# Patient Record
Sex: MALE | URBAN - METROPOLITAN AREA
[De-identification: names, ages, dates, MRNs, and addresses within clinical notes are randomized per-mention and may not be internally consistent; named-entity substitution may affect disease eponyms.]

---

## 2019-12-11 ENCOUNTER — APPOINTMENT (RX ONLY)
Dept: URBAN - METROPOLITAN AREA CLINIC 168 | Facility: CLINIC | Age: 29
Setting detail: DERMATOLOGY
End: 2019-12-11

## 2019-12-11 DIAGNOSIS — D69.0 ALLERGIC PURPURA: ICD-10-CM

## 2019-12-11 PROBLEM — L30.9 DERMATITIS, UNSPECIFIED: Status: ACTIVE | Noted: 2019-12-11

## 2019-12-11 PROCEDURE — ? COUNSELING

## 2019-12-11 PROCEDURE — ? BIOPSY BY PUNCH METHOD

## 2019-12-11 PROCEDURE — 11104 PUNCH BX SKIN SINGLE LESION: CPT

## 2019-12-11 ASSESSMENT — LOCATION ZONE DERM: LOCATION ZONE: ARM

## 2019-12-11 ASSESSMENT — LOCATION SIMPLE DESCRIPTION DERM: LOCATION SIMPLE: LEFT UPPER ARM

## 2019-12-11 ASSESSMENT — LOCATION DETAILED DESCRIPTION DERM: LOCATION DETAILED: LEFT PROXIMAL POSTERIOR UPPER ARM

## 2019-12-11 ASSESSMENT — SEVERITY ASSESSMENT: SEVERITY: MODERATE TO SEVERE

## 2019-12-11 NOTE — PROCEDURE: BIOPSY BY PUNCH METHOD
X Size Of Lesion In Cm (Optional): 0
Notification Instructions: Patient will be notified of biopsy results. However, patient instructed to call the office if not contacted within 2 weeks.
Bill [id number] For Specimen Handling/Conveyance To Laboratory?: no
Detail Level: Detailed
Punch Size In Mm: 4
Consent: Written consent was obtained and risks were reviewed including but not limited to scarring, infection, bleeding, scabbing, incomplete removal, nerve damage and allergy to anesthesia.
Suture Removal: 10 days
Anesthesia Volume In Cc (Will Not Render If 0): 1.5
Billing Type: Third-Party Bill
Epidermal Sutures: 4-0 Ethilon
Wound Care: Petrolatum
Render Post-Care Instructions In Note?: yes
Biopsy Type: DIF
Hemostasis: None
Dressing: bandage
Post-Care Instructions: I reviewed with the patient in detail post-care instructions. Patient is to keep the biopsy site dry overnight, and then starting tomorrow wash with an antiobiotic soap, rinse with water, and apply petrolatum and a bandage. Repeat this daily until sutures are removed at scheduled suture removal appointment. Written wound care handout given to patient and discharged home. Procedure tolerated well.
Lab: [id number]
Home Suture Removal Text: Patient was provided a home suture removal kit and will remove their sutures at home.  If they have any questions or difficulties they will call the office.
Anesthesia Type: 1% lidocaine with epinephrine and a 1:10 solution of 8.4% sodium bicarbonate

## 2019-12-11 NOTE — HPI: RASH
What Type Of Note Output Would You Prefer (Optional)?: Bullet Format
How Severe Is Your Rash?: moderate
Is This A New Presentation, Or A Follow-Up?: Rash
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 It went away with treatment (steroids did not work at that time, may have been treated with Cellcept by renal).  He flared mildly about a  year and a half ago with a flu, but was not treated and this episode resolved on its own.   This current flare started early September/late October and is not resolving as easily.  He has associated joint and arthritis pain.  In the past he has not had this with past flares. He notes He is seeing nephrology, saw previous dermatologist, bx of skin showed LCV but DIF was negative. To his knowledge, all work up thus far has been negative for any other underlying causes/triggers including negative serologies for Lupus and Hepatitis. He does not know if he was tested for RA. \\n\\nHe sees Dr. Fred Morgan and has a follow up scheduled for January 20, 2020.  Cellcept dose currently 1.5gm BID (on a total of 1 month, dose increased from 1gm BID a few days ago) and prednisone 60mg. \\n\\nConcern is mainly that the DIF on the recent bx was negative, which would be inconsistent with HSP.

## 2019-12-11 NOTE — PROCEDURE: COUNSELING
Detail Level: Detailed
Patient Specific Counseling (Will Not Stick From Patient To Patient): ****\\ndiscussed with pt that I have had a few patients with HSP in childhood that persisted, or recurred, in early adulthood. DIF of lesions of HSP that are greater than 24 hours old can be falsely negative, and so a  negative DIF does not necessarily rule this out. \\n\\nWill repeat DIF today of a lesion of the arm that looks more new in the hopes of capturing IgA, but discussed taht if again it is negative, this still does not rule out HSP. \\n\\nIf the biopsy is + for IgA, discussed that IgA vasculitis can be due to HSP, but also due to RA, or monoclonal gammopathy IgA subtype, related to underlying Lupus, and so his work up would need to be reviewed to be sure these conditions have been tested for.

## 2019-12-20 ENCOUNTER — APPOINTMENT (RX ONLY)
Dept: URBAN - METROPOLITAN AREA CLINIC 168 | Facility: CLINIC | Age: 29
Setting detail: DERMATOLOGY
End: 2019-12-20

## 2019-12-20 DIAGNOSIS — Z48.02 ENCOUNTER FOR REMOVAL OF SUTURES: ICD-10-CM

## 2019-12-20 PROCEDURE — ? SUTURE REMOVAL (GLOBAL PERIOD)

## 2019-12-20 PROCEDURE — 99024 POSTOP FOLLOW-UP VISIT: CPT

## 2019-12-20 ASSESSMENT — LOCATION ZONE DERM: LOCATION ZONE: ARM

## 2019-12-20 ASSESSMENT — LOCATION SIMPLE DESCRIPTION DERM: LOCATION SIMPLE: RIGHT UPPER ARM

## 2019-12-20 ASSESSMENT — LOCATION DETAILED DESCRIPTION DERM: LOCATION DETAILED: RIGHT DISTAL POSTERIOR UPPER ARM

## 2019-12-20 NOTE — PROCEDURE: SUTURE REMOVAL (GLOBAL PERIOD)
Add 24898 Cpt? (Important Note: In 2017 The Use Of 38327 Is Being Tracked By Cms To Determine Future Global Period Reimbursement For Global Periods): yes
Detail Level: Detailed